# Patient Record
Sex: FEMALE | Race: WHITE
[De-identification: names, ages, dates, MRNs, and addresses within clinical notes are randomized per-mention and may not be internally consistent; named-entity substitution may affect disease eponyms.]

---

## 2018-04-02 ENCOUNTER — HOSPITAL ENCOUNTER (EMERGENCY)
Dept: HOSPITAL 74 - JER | Age: 23
Discharge: HOME | End: 2018-04-02
Payer: COMMERCIAL

## 2018-04-02 VITALS — SYSTOLIC BLOOD PRESSURE: 122 MMHG | HEART RATE: 70 BPM | DIASTOLIC BLOOD PRESSURE: 61 MMHG

## 2018-04-02 VITALS — TEMPERATURE: 97.4 F

## 2018-04-02 VITALS — BODY MASS INDEX: 22.3 KG/M2

## 2018-04-02 DIAGNOSIS — F41.9: ICD-10-CM

## 2018-04-02 DIAGNOSIS — F41.0: Primary | ICD-10-CM

## 2018-04-02 DIAGNOSIS — Z87.09: ICD-10-CM

## 2018-04-02 DIAGNOSIS — F17.210: ICD-10-CM

## 2018-04-02 LAB
ALBUMIN SERPL-MCNC: 4.3 G/DL (ref 3.4–5)
ALP SERPL-CCNC: 95 U/L (ref 45–117)
ALT SERPL-CCNC: < 6 U/L (ref 12–78)
ANION GAP SERPL CALC-SCNC: 10 MMOL/L (ref 8–16)
AST SERPL-CCNC: 12 U/L (ref 15–37)
BASOPHILS # BLD: 0.5 % (ref 0–2)
BILIRUB SERPL-MCNC: 0.5 MG/DL (ref 0.2–1)
BUN SERPL-MCNC: 7 MG/DL (ref 7–18)
CALCIUM SERPL-MCNC: 9.4 MG/DL (ref 8.5–10.1)
CHLORIDE SERPL-SCNC: 104 MMOL/L (ref 98–107)
CO2 SERPL-SCNC: 28 MMOL/L (ref 21–32)
CREAT SERPL-MCNC: 0.6 MG/DL (ref 0.55–1.02)
DEPRECATED RDW RBC AUTO: 13.7 % (ref 11.6–15.6)
EOSINOPHIL # BLD: 0.6 % (ref 0–4.5)
GLUCOSE SERPL-MCNC: 89 MG/DL (ref 74–106)
HCT VFR BLD CALC: 40.9 % (ref 32.4–45.2)
HGB BLD-MCNC: 14.2 GM/DL (ref 10.7–15.3)
LYMPHOCYTES # BLD: 16.7 % (ref 8–40)
MCH RBC QN AUTO: 31.9 PG (ref 25.7–33.7)
MCHC RBC AUTO-ENTMCNC: 34.6 G/DL (ref 32–36)
MCV RBC: 92.3 FL (ref 80–96)
MONOCYTES # BLD AUTO: 5.2 % (ref 3.8–10.2)
NEUTROPHILS # BLD: 77 % (ref 42.8–82.8)
PLATELET # BLD AUTO: 340 K/MM3 (ref 134–434)
PMV BLD: 8.7 FL (ref 7.5–11.1)
POTASSIUM SERPLBLD-SCNC: 3.8 MMOL/L (ref 3.5–5.1)
PROT SERPL-MCNC: 8 G/DL (ref 6.4–8.2)
RBC # BLD AUTO: 4.44 M/MM3 (ref 3.6–5.2)
SODIUM SERPL-SCNC: 142 MMOL/L (ref 136–145)
WBC # BLD AUTO: 8.9 K/MM3 (ref 4–10)

## 2018-04-02 NOTE — PDOC
Rapid Medical Evaluation


Time Seen by Provider: 04/02/18 16:25


Medical Evaluation: 


 Allergies











Allergy/AdvReac Type Severity Reaction Status Date / Time


 


No Known Allergies Allergy   Verified 04/02/18 16:24











04/02/18 16:25


The patient presents with a chief complaint of: body pain, dyspnea


 I have performed a brief in-person evaluation of this patient.


 Pertinent physical exam findings: vss, uncomfortable


 I have ordered the following:  ekg, labs


 The patient will proceed to the ED for further evaluation.


04/02/18 16:26

## 2018-04-02 NOTE — PDOC
History of Present Illness





- General


History Source: Patient


Exam Limitations: No Limitations





- History of Present Illness


Initial Comments: 





18 18:01


The patient is a 22 year old female with significant PMH of anxiety(previously 

on Klonopin) who presents to the emergency department complaining of chest pain 

beginning since this morning. The patient describes the chest pain as sharp in 

nature in the setting of anxiety.  The patient also reports associated diffuse 

body cramps with her chest pain. The patient reports experiencing chest pain in 

the past with her anxiety but notes this episode was more concerning to her, 

prompting her visit. The patient notes she has not been treated for her anxiety 

in over a year. The patient denies suicidal and homicidal ideations. Denies 

self mutilating thoughts. 





The patient denies shortness of breath, headache, and dizziness.


Denies fevers, chills, nausea, vomit, diarrhea, and constipation.


Denies dysuria, frequency, urgency, and hematuria.





Allergies: NKA


Past surgical history: 


Social history: Current everyday cigarette smoker. No reported alcohol or drug 

use.





<Yoana Ellison - Last Filed: 18 18:01>





<Zaria Reyes - Last Filed: 18 11:29>





- General


Chief Complaint: Psychiatric


Stated Complaint: CHEST PAIN


Time Seen by Provider: 18 16:25





Past History





<Yoana Ellison - Last Filed: 18 18:01>





- Past Medical History


Asthma: Yes


Cancer: No


Cardiac Disorders: No


COPD: No


Diabetes: No


HTN: No


Psychiatric Problems: Yes (anxiety)


Seizures: No


Thyroid Disease: No





- Immunization History


Immunization Up to Date: Yes





- Suicide/Smoking/Psychosocial Hx


Smoking History: Current every day smoker


Have you smoked in the past 12 months: No


Number of Cigarettes Smoked Daily: 2


Information on smoking cessation initiated: Yes


'Breaking Loose' booklet given: 18


Hx Alcohol Use: No


Drug/Substance Use Hx: No


Substance Use Type: None


Hx Substance Use Treatment: No





<Zaria Reyes - Last Filed: 18 11:29>





- Past Medical History


Allergies/Adverse Reactions: 


 Allergies











Allergy/AdvReac Type Severity Reaction Status Date / Time


 


No Known Allergies Allergy   Verified 18 16:24











Home Medications: 


Ambulatory Orders





NK [No Known Home Medication]  16 











**Review of Systems





- Review of Systems


Able to Perform ROS?: Yes


Comments:: 





18 18:02


GENERAL/CONSTITUTIONAL: No fever or chills. No weakness.


HEAD, EYES, EARS, NOSE AND THROAT: No change in vision. No ear pain or 

discharge. No sore throat.


CARDIOVASCULAR: +chest pain. No shortness of breath.


RESPIRATORY: No cough, wheezing, or hemoptysis.


GASTROINTESTINAL: No nausea, vomiting, diarrhea or constipation.


GENITOURINARY: No dysuria, frequency, or change in urination.


MUSCULOSKELETAL: No joint or muscle swelling or pain. No neck or back pain.


SKIN: No rash


NEUROLOGIC: No headache, vertigo, loss of consciousness, or change in strength/

sensation.


ENDOCRINE: No increased thirst. No abnormal weight change.


HEMATOLOGIC/LYMPHATIC: No anemia, easy bleeding, or history of blood clots.


ALLERGIC/IMMUNOLOGIC: No hives or skin allergy.





<Yoana Ellison - Last Filed: 18 18:01>





*Physical Exam





- Vital Signs


 Last Vital Signs











Temp Pulse Resp BP Pulse Ox


 


 97.4 F L  68   18   128/45   100 


 


 18 16:24  18 16:24  18 16:24  18 16:24  18 16:24














- Physical Exam


Comments: 





18 18:03


GENERAL: +Fidgeting. Awake, alert, and fully oriented.


HEAD: No signs of trauma


EYES: PERRLA, EOMI, sclera anicteric, conjunctiva clear


ENT: Auricles normal inspection, hearing grossly normal, nares patent, 

oropharynx clear without exudates. Moist mucosa


NECK: Normal ROM, supple, no lymphadenopathy, JVD, or masses


LUNGS: Breath sounds equal, clear to auscultation bilaterally.  No wheezes, and 

no crackles


HEART: Regular rate and rhythm, normal S1 and S2, no murmurs, rubs or gallops


ABDOMEN: Soft, nontender, normoactive bowel sounds.  No guarding, no rebound.  

No masses


EXTREMITIES: Normal range of motion, no edema.  No clubbing or cyanosis. No 

cords, erythema, or tenderness


NEUROLOGICAL: Cranial nerves II through XII grossly intact.  Normal speech, 

normal gait


SKIN: Warm, Dry, normal turgor, no rashes or lesions noted.








<Yoana Ellison - Last Filed: 18 18:01>





- Vital Signs


 Last Vital Signs











Temp Pulse Resp BP Pulse Ox


 


 97.4 F L  68   18   128/45   100 


 


 18 16:24  18 16:24  18 16:24  18 16:24  18 16:24














<Zaria Reyes - Last Filed: 18 11:29>





ED Treatment Course





- LABORATORY


CBC & Chemistry Diagram: 


 18 17:08





 18 17:16





- ADDITIONAL ORDERS


Additional order review: 


 











  18





  17:08


 


RBC  4.44


 


MCV  92.3


 


MCHC  34.6


 


RDW  13.7


 


MPV  8.7


 


Neutrophils %  77.0


 


Lymphocytes %  16.7  D


 


Monocytes %  5.2


 


Eosinophils %  0.6


 


Basophils %  0.5














- Medications


Given in the ED: 


ED Medications














Discontinued Medications














Generic Name Dose Route Start Last Admin





  Trade Name Freq  PRN Reason Stop Dose Admin


 


Alprazolam  0.25 mg  18 17:25  18 17:37





  Xanax -  PO  18 17:26  0.25 mg





  ONCE ONE   Administration














<Yoana Ellison - Last Filed: 18 18:01>





- LABORATORY


CBC & Chemistry Diagram: 


 18 17:08





 18 17:16





<Zaria Reyes - Last Filed: 18 11:29>





Medical Decision Making





- Medical Decision Making





18 18:31


Ms Cordero Presents emergency Department today with a complaint of chest pain.


Patient states her symptoms began this morning.


Patient states that began this morning.


Patient states she awakens every morning with anxiety.


Previously she was being seen by a psychiatrist and therapist.


She discontinued both towards the end of 2017.


Patient states despite the fact that she is anxious every morning, she came to 

the emergency department today because her symptoms are much worse today.


She reports midsternal chest pain, described as squeezing.


No shortness of breath.


Patient has no history of exogenous estrogen use, recent travel, lower 

extremity edema, no diagnoses of cancer, no recent surgery no prolonged 

immobilization





Patient has had similar symptoms to the one she currently has in the past.








On examination:





Patient appears anxious, fidgety.


Heart is regular


Lungs are clear to auscultation


No abdominal tenderness to palpation,








Patient is low risk Wells, Percocet negative.


I doubt PE.


I doubt coronary artery disease.


EKG is normal sinus rhythm, rate of 62 bpm, axis normal, intervals are normal, 

no ST elevation or depression


Will do chest x-ray to eval for pneumothorax, pleural effusion


Likely related to anxiety





Will give Xanax


Anticipate discharge


Follow up with PMD





Clinical Impression: Anxiey, initial presentation


18 18:36


 Laboratory Tests











  18





  17:08 17:08


 


WBC  8.9 


 


Hgb  14.2  D 


 


Hct  40.9 


 


Plt Count  340  D 


 


Urine HCG, Qual   Negative











Pt states she feels much better


Pt asked to follow up with her psychiatrist and therapist





Clinical impression: anxiety, initial presentation





<Zaria Reyes - Last Filed: 18 11:29>





*DC/Admit/Observation/Transfer





- Attestations


Scribe Attestion: 





18 18:03





Documentation prepared by Yoana Ellison, acting as medical scribe for Zaria Reyes MD.





<Yoana Ellison - Last Filed: 18 18:01>





- Discharge Dispostion


Admit: No





<Zaria Reyes - Last Filed: 18 11:29>


Diagnosis at time of Disposition: 


 Anxiety attack








- Discharge Dispostion


Disposition: HOME


Condition at time of disposition: Stable





- Referrals


Referrals: 


Nikki Valente MD [Staff Physician] - 





- Patient Instructions


Printed Discharge Instructions:  [facility] for [facility]xiety -- Adult


Additional Instructions: 


Ms. Cordero





Thank you for coming in to the ER 


Please be sure to follow up with a psychiatrist and a therapist


Please return to the ER for any other concerns or complaints


If you ever feel unsafe - like you want to harm yourself or any one else, 

please come to the ER immediately

## 2018-04-03 NOTE — EKG
Test Reason : 

Blood Pressure : ***/*** mmHG

Vent. Rate : 062 BPM     Atrial Rate : 062 BPM

   P-R Int : 132 ms          QRS Dur : 074 ms

    QT Int : 406 ms       P-R-T Axes : 038 051 041 degrees

   QTc Int : 412 ms

 

NORMAL SINUS RHYTHM

NORMAL ECG

WHEN COMPARED WITH ECG OF 04-MAR-2016 22:33,

NO SIGNIFICANT CHANGE WAS FOUND

Confirmed by Nilay Betts MD ([id number]) on 4/3/2018 10:10:13 AM

 

Referred By:             Confirmed By:Nilay Betts MD

## 2021-10-24 ENCOUNTER — HOSPITAL ENCOUNTER (EMERGENCY)
Dept: HOSPITAL 74 - JER | Age: 26
Discharge: HOME | End: 2021-10-24
Payer: COMMERCIAL

## 2021-10-24 VITALS — BODY MASS INDEX: 26.9 KG/M2

## 2021-10-24 VITALS — DIASTOLIC BLOOD PRESSURE: 80 MMHG | HEART RATE: 77 BPM | SYSTOLIC BLOOD PRESSURE: 131 MMHG | TEMPERATURE: 98.8 F

## 2021-10-24 DIAGNOSIS — R09.81: Primary | ICD-10-CM

## 2021-10-24 PROCEDURE — U0003 INFECTIOUS AGENT DETECTION BY NUCLEIC ACID (DNA OR RNA); SEVERE ACUTE RESPIRATORY SYNDROME CORONAVIRUS 2 (SARS-COV-2) (CORONAVIRUS DISEASE [COVID-19]), AMPLIFIED PROBE TECHNIQUE, MAKING USE OF HIGH THROUGHPUT TECHNOLOGIES AS DESCRIBED BY CMS-2020-01-R: HCPCS

## 2021-10-24 PROCEDURE — U0005 INFEC AGEN DETEC AMPLI PROBE: HCPCS

## 2021-10-24 PROCEDURE — C9803 HOPD COVID-19 SPEC COLLECT: HCPCS

## 2022-05-20 ENCOUNTER — HOSPITAL ENCOUNTER (EMERGENCY)
Dept: HOSPITAL 74 - JER | Age: 27
LOS: 1 days | Discharge: HOME | End: 2022-05-21
Payer: COMMERCIAL

## 2022-05-20 VITALS — DIASTOLIC BLOOD PRESSURE: 84 MMHG | SYSTOLIC BLOOD PRESSURE: 129 MMHG | TEMPERATURE: 98 F

## 2022-05-20 VITALS — BODY MASS INDEX: 25.7 KG/M2
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
## 2022-05-20 DIAGNOSIS — M54.2: Primary | ICD-10-CM

## 2022-05-20 PROCEDURE — 3E0233Z INTRODUCTION OF ANTI-INFLAMMATORY INTO MUSCLE, PERCUTANEOUS APPROACH: ICD-10-PCS

## 2022-05-21 VITALS — HEART RATE: 69 BPM

## 2022-06-15 ENCOUNTER — HOSPITAL ENCOUNTER (EMERGENCY)
Dept: HOSPITAL 74 - JER | Age: 27
Discharge: HOME | End: 2022-06-15
Payer: COMMERCIAL

## 2022-06-15 VITALS — HEART RATE: 62 BPM | SYSTOLIC BLOOD PRESSURE: 139 MMHG | DIASTOLIC BLOOD PRESSURE: 78 MMHG | TEMPERATURE: 97.9 F

## 2022-06-15 VITALS — BODY MASS INDEX: 27.9 KG/M2

## 2022-06-15 DIAGNOSIS — K52.9: Primary | ICD-10-CM

## 2022-06-15 LAB
ALBUMIN SERPL-MCNC: 4.3 G/DL (ref 3.4–5)
ALP SERPL-CCNC: 89 U/L (ref 45–117)
ALT SERPL-CCNC: 11 U/L (ref 13–61)
ANION GAP SERPL CALC-SCNC: 6 MMOL/L (ref 8–16)
AST SERPL-CCNC: 14 U/L (ref 15–37)
BASOPHILS # BLD: 0.3 % (ref 0–2)
BILIRUB SERPL-MCNC: 0.2 MG/DL (ref 0.2–1)
BUN SERPL-MCNC: 11.7 MG/DL (ref 7–18)
CALCIUM SERPL-MCNC: 9.7 MG/DL (ref 8.5–10.1)
CHLORIDE SERPL-SCNC: 108 MMOL/L (ref 98–107)
CO2 SERPL-SCNC: 29 MMOL/L (ref 21–32)
CREAT SERPL-MCNC: 0.8 MG/DL (ref 0.55–1.3)
DEPRECATED RDW RBC AUTO: 13.1 % (ref 11.6–15.6)
EOSINOPHIL # BLD: 0.4 % (ref 0–4.5)
GLUCOSE SERPL-MCNC: 106 MG/DL (ref 74–106)
HCT VFR BLD CALC: 41.1 % (ref 32.4–45.2)
HGB BLD-MCNC: 13.7 GM/DL (ref 10.7–15.3)
LIPASE SERPL-CCNC: 98 U/L (ref 73–393)
LYMPHOCYTES # BLD: 8.8 % (ref 8–40)
MAGNESIUM SERPL-MCNC: 2.2 MG/DL (ref 1.8–2.4)
MCH RBC QN AUTO: 29.9 PG (ref 25.7–33.7)
MCHC RBC AUTO-ENTMCNC: 33.4 G/DL (ref 32–36)
MCV RBC: 89.6 FL (ref 80–96)
MONOCYTES # BLD AUTO: 3.5 % (ref 3.8–10.2)
NEUTROPHILS # BLD: 87 % (ref 42.8–82.8)
PLATELET # BLD AUTO: 408 10^3/UL (ref 134–434)
PMV BLD: 7.8 FL (ref 7.5–11.1)
PROT SERPL-MCNC: 8 G/DL (ref 6.4–8.2)
RBC # BLD AUTO: 4.59 M/MM3 (ref 3.6–5.2)
SODIUM SERPL-SCNC: 143 MMOL/L (ref 136–145)
WBC # BLD AUTO: 10.2 K/MM3 (ref 4–10)

## 2022-06-15 PROCEDURE — 3E033GC INTRODUCTION OF OTHER THERAPEUTIC SUBSTANCE INTO PERIPHERAL VEIN, PERCUTANEOUS APPROACH: ICD-10-PCS

## 2024-08-02 ENCOUNTER — HOSPITAL ENCOUNTER (EMERGENCY)
Dept: HOSPITAL 74 - JERFT | Age: 29
Discharge: HOME | End: 2024-08-02
Payer: COMMERCIAL

## 2024-08-02 VITALS
RESPIRATION RATE: 18 BRPM | HEART RATE: 80 BPM | DIASTOLIC BLOOD PRESSURE: 79 MMHG | TEMPERATURE: 97.6 F | SYSTOLIC BLOOD PRESSURE: 153 MMHG

## 2024-08-02 VITALS — BODY MASS INDEX: 28.3 KG/M2
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
## 2024-08-02 DIAGNOSIS — S93.401A: Primary | ICD-10-CM

## 2024-08-02 RX ADMIN — IBUPROFEN ONE MG: 600 TABLET, FILM COATED ORAL at 09:41
